# Patient Record
Sex: MALE | Race: WHITE | URBAN - METROPOLITAN AREA
[De-identification: names, ages, dates, MRNs, and addresses within clinical notes are randomized per-mention and may not be internally consistent; named-entity substitution may affect disease eponyms.]

---

## 2017-07-06 ENCOUNTER — HISTORICAL (OUTPATIENT)
Dept: LAB | Facility: HOSPITAL | Age: 42
End: 2017-07-06

## 2017-07-06 LAB
HAV IGM SERPL QL IA: NEGATIVE
HBV CORE IGM SERPL QL IA: NEGATIVE
HBV SURFACE AG SERPL QL IA: NEGATIVE
HCV AB SERPL QL IA: NEGATIVE
HEPATITIS PANEL INTERP: NORMAL
HIV 1+2 AB+HIV1 P24 AG SERPL QL IA: NEGATIVE
RPR SER QL: NORMAL

## 2018-02-14 ENCOUNTER — HISTORICAL (OUTPATIENT)
Dept: LAB | Facility: HOSPITAL | Age: 43
End: 2018-02-14

## 2019-05-17 ENCOUNTER — HISTORICAL (OUTPATIENT)
Dept: ADMINISTRATIVE | Facility: HOSPITAL | Age: 44
End: 2019-05-17

## 2019-05-17 LAB
ABS NEUT (OLG): 3 X10(3)/MCL (ref 2.1–9.2)
ALBUMIN SERPL-MCNC: 4.6 GM/DL (ref 3.4–5)
ALBUMIN/GLOB SERPL: 2.09 {RATIO} (ref 1.5–2.5)
ALP SERPL-CCNC: 62 UNIT/L (ref 38–126)
ALT SERPL-CCNC: 34 UNIT/L (ref 7–52)
AST SERPL-CCNC: 23 UNIT/L (ref 15–37)
BILIRUB SERPL-MCNC: 0.5 MG/DL (ref 0.2–1)
BILIRUBIN DIRECT+TOT PNL SERPL-MCNC: 0.1 MG/DL (ref 0–0.5)
BILIRUBIN DIRECT+TOT PNL SERPL-MCNC: 0.4 MG/DL
BUN SERPL-MCNC: 13 MG/DL (ref 7–18)
CALCIUM SERPL-MCNC: 9.1 MG/DL (ref 8.5–10)
CHLORIDE SERPL-SCNC: 104 MMOL/L (ref 98–107)
CHOLEST SERPL-MCNC: 263 MG/DL (ref 0–200)
CHOLEST/HDLC SERPL: 6.9 {RATIO}
CO2 SERPL-SCNC: 29 MMOL/L (ref 21–32)
CREAT SERPL-MCNC: 0.85 MG/DL (ref 0.6–1.3)
ERYTHROCYTE [DISTWIDTH] IN BLOOD BY AUTOMATED COUNT: 11.7 % (ref 11.5–17)
GLOBULIN SER-MCNC: 2.2 GM/DL (ref 1.2–3)
GLUCOSE SERPL-MCNC: 108 MG/DL (ref 74–106)
HCT VFR BLD AUTO: 42.8 % (ref 42–52)
HDLC SERPL-MCNC: 38 MG/DL (ref 35–60)
HGB BLD-MCNC: 15.1 GM/DL (ref 14–18)
LDLC SERPL CALC-MCNC: 145 MG/DL (ref 0–129)
LYMPHOCYTES # BLD AUTO: 1.9 X10(3)/MCL (ref 0.6–3.4)
LYMPHOCYTES NFR BLD AUTO: 35.5 % (ref 13–40)
MCH RBC QN AUTO: 31.8 PG (ref 27–31.2)
MCHC RBC AUTO-ENTMCNC: 35 GM/DL (ref 32–36)
MCV RBC AUTO: 90 FL (ref 80–94)
MONOCYTES # BLD AUTO: 0.5 X10(3)/MCL (ref 0.1–1.3)
MONOCYTES NFR BLD AUTO: 9.1 % (ref 0.1–24)
NEUTROPHILS NFR BLD AUTO: 55.4 % (ref 47–80)
PLATELET # BLD AUTO: 132 X10(3)/MCL (ref 130–400)
PMV BLD AUTO: 10.7 FL (ref 9.4–12.4)
POTASSIUM SERPL-SCNC: 4.1 MMOL/L (ref 3.5–5.1)
PROT SERPL-MCNC: 6.8 GM/DL (ref 6.4–8.2)
RBC # BLD AUTO: 4.75 X10(6)/MCL (ref 4.7–6.1)
SODIUM SERPL-SCNC: 137 MMOL/L (ref 136–145)
TRIGL SERPL-MCNC: 503 MG/DL (ref 30–150)
TSH SERPL-ACNC: 0.59 MIU/ML (ref 0.35–4.94)
VLDLC SERPL CALC-MCNC: 100.6 MG/DL
WBC # SPEC AUTO: 5.4 X10(3)/MCL (ref 4.5–11.5)

## 2019-08-21 ENCOUNTER — HISTORICAL (OUTPATIENT)
Dept: ADMINISTRATIVE | Facility: HOSPITAL | Age: 44
End: 2019-08-21

## 2019-08-21 LAB
CHOLEST SERPL-MCNC: 216 MG/DL (ref 0–200)
CHOLEST/HDLC SERPL: 5.1 {RATIO}
HDLC SERPL-MCNC: 42 MG/DL (ref 35–60)
LDLC SERPL CALC-MCNC: 142 MG/DL (ref 0–129)
TRIGL SERPL-MCNC: 173 MG/DL (ref 30–150)
VLDLC SERPL CALC-MCNC: 34.6 MG/DL

## 2020-07-15 ENCOUNTER — HISTORICAL (OUTPATIENT)
Dept: ENDOSCOPY | Facility: HOSPITAL | Age: 45
End: 2020-07-15

## 2020-12-01 ENCOUNTER — HISTORICAL (OUTPATIENT)
Dept: ADMINISTRATIVE | Facility: HOSPITAL | Age: 45
End: 2020-12-01

## 2020-12-01 LAB
APPEARANCE, UA: CLEAR
BACTERIA #/AREA URNS AUTO: NORMAL /HPF
BILIRUB UR QL STRIP: NEGATIVE MG/DL
COLOR UR: YELLOW
GLUCOSE (UA): NEGATIVE MG/DL
HGB UR QL STRIP: NEGATIVE UNIT/L
KETONES UR QL STRIP: NEGATIVE MG/DL
LEUKOCYTE ESTERASE UR QL STRIP: NEGATIVE UNIT/L
NITRITE UR QL STRIP.AUTO: NEGATIVE
PH UR STRIP: 7.5 [PH]
PROT UR QL STRIP: NEGATIVE MG/DL
RBC #/AREA URNS HPF: NORMAL /HPF
SP GR UR STRIP: 1.02
SQUAMOUS EPITHELIAL, UA: NORMAL /LPF
UROBILINOGEN UR STRIP-ACNC: 0.2 MG/DL
WBC #/AREA URNS AUTO: NORMAL /[HPF]

## 2020-12-03 LAB — FINAL CULTURE: NO GROWTH

## 2021-03-02 ENCOUNTER — HISTORICAL (OUTPATIENT)
Dept: ADMINISTRATIVE | Facility: HOSPITAL | Age: 46
End: 2021-03-02

## 2021-03-02 LAB
ABS NEUT (OLG): 2.9 X10(3)/MCL (ref 2.1–9.2)
ALBUMIN SERPL-MCNC: 4.4 GM/DL (ref 3.4–5)
ALBUMIN/GLOB SERPL: 2 {RATIO} (ref 1.5–2.5)
ALP SERPL-CCNC: 52 UNIT/L (ref 38–126)
ALT SERPL-CCNC: 68 UNIT/L (ref 7–52)
AST SERPL-CCNC: 35 UNIT/L (ref 15–37)
BILIRUB SERPL-MCNC: 0.9 MG/DL (ref 0.2–1)
BILIRUBIN DIRECT+TOT PNL SERPL-MCNC: 0.2 MG/DL (ref 0–0.5)
BILIRUBIN DIRECT+TOT PNL SERPL-MCNC: 0.7 MG/DL
BUN SERPL-MCNC: 15 MG/DL (ref 7–18)
CALCIUM SERPL-MCNC: 9.3 MG/DL (ref 8.5–10.1)
CHLORIDE SERPL-SCNC: 103 MMOL/L (ref 98–107)
CHOLEST SERPL-MCNC: 264 MG/DL (ref 0–200)
CHOLEST/HDLC SERPL: 6 {RATIO}
CO2 SERPL-SCNC: 31 MMOL/L (ref 21–32)
CREAT SERPL-MCNC: 1.01 MG/DL (ref 0.6–1.3)
ERYTHROCYTE [DISTWIDTH] IN BLOOD BY AUTOMATED COUNT: 11.8 % (ref 11.5–17)
GLOBULIN SER-MCNC: 1.8 GM/DL (ref 1.2–3)
GLUCOSE SERPL-MCNC: 105 MG/DL (ref 74–106)
HCT VFR BLD AUTO: 41.4 % (ref 42–52)
HDLC SERPL-MCNC: 44 MG/DL (ref 35–60)
HGB BLD-MCNC: 14.3 GM/DL (ref 14–18)
LDLC SERPL CALC-MCNC: 190 MG/DL (ref 0–129)
LYMPHOCYTES # BLD AUTO: 1.7 X10(3)/MCL (ref 0.6–3.4)
LYMPHOCYTES NFR BLD AUTO: 34 % (ref 13–40)
MCH RBC QN AUTO: 31.2 PG (ref 27–31.2)
MCHC RBC AUTO-ENTMCNC: 34 GM/DL (ref 32–36)
MCV RBC AUTO: 90 FL (ref 80–94)
MONOCYTES # BLD AUTO: 0.4 X10(3)/MCL (ref 0.1–1.3)
MONOCYTES NFR BLD AUTO: 7.9 % (ref 0.1–24)
NEUTROPHILS NFR BLD AUTO: 58.1 % (ref 47–80)
PLATELET # BLD AUTO: 143 X10(3)/MCL (ref 130–400)
PMV BLD AUTO: 11.4 FL (ref 9.4–12.4)
POTASSIUM SERPL-SCNC: 4.5 MMOL/L (ref 3.5–5.1)
PROT SERPL-MCNC: 6.6 GM/DL (ref 6.4–8.2)
RBC # BLD AUTO: 4.58 X10(6)/MCL (ref 4.7–6.1)
SODIUM SERPL-SCNC: 138 MMOL/L (ref 136–145)
TRIGL SERPL-MCNC: 180 MG/DL (ref 30–150)
TSH SERPL-ACNC: 0.64 MIU/ML (ref 0.35–4.94)
VLDLC SERPL CALC-MCNC: 36 MG/DL
WBC # SPEC AUTO: 5 X10(3)/MCL (ref 4.5–11.5)

## 2021-06-08 ENCOUNTER — HISTORICAL (OUTPATIENT)
Dept: ADMINISTRATIVE | Facility: HOSPITAL | Age: 46
End: 2021-06-08

## 2021-06-08 LAB
CHOLEST SERPL-MCNC: 247 MG/DL (ref 0–200)
CHOLEST/HDLC SERPL: 5.3 {RATIO}
HDLC SERPL-MCNC: 47 MG/DL (ref 35–60)
LDLC SERPL CALC-MCNC: 157 MG/DL (ref 0–129)
TRIGL SERPL-MCNC: 270 MG/DL (ref 30–150)
VLDLC SERPL CALC-MCNC: 54 MG/DL

## 2022-04-11 ENCOUNTER — HISTORICAL (OUTPATIENT)
Dept: ADMINISTRATIVE | Facility: HOSPITAL | Age: 47
End: 2022-04-11

## 2022-04-29 VITALS
DIASTOLIC BLOOD PRESSURE: 88 MMHG | WEIGHT: 178.13 LBS | BODY MASS INDEX: 26.38 KG/M2 | OXYGEN SATURATION: 98 % | SYSTOLIC BLOOD PRESSURE: 120 MMHG | HEIGHT: 69 IN

## 2022-04-30 NOTE — OP NOTE
DATE OF SURGERY:    07/15/2020    SURGEON:  Roberto Thomas MD    PROCEDURE:  Colonoscopy, terminal ileoscopy, hemoclip placement and snare polypectomy.    PREOPERATIVE DIAGNOSIS:  Ominous family history for colon cancer, mother in her 50s, maternal grandfather.  Screening colonoscopy planned.    POSTOPERATIVE DIAGNOSES:    1. Adequate sedation with Diprivan per Anesthesia.  2. Good colon prep with successful exam to the cecum and into a normal terminal ileum.  3. Two polyps noted at around 15 cm in the rectum; one was diminutive, a 4 or 5 mm polyp addressed with a cold snare; the other polyp was a pedunculated entity that measured well over a cm.  We placed a hemoclip at the base of the stalk and were able to ensnare the polyp, though we were relegated to a piecemeal approach taking the bulk in 1 fell swoop and then residual in portions.  At the end of the procedure, there was no visible residual.  The initial clip dislodged and a second hemoclip was placed at the polyp site.    PROCEDURE IN DETAIL:  The patient consented for the procedure after a detailed explanation of the risks and complications, including bleeding, perforation and adverse reaction to sedation.  The patient was placed in the left lateral decubitus position.  Initially we examined the perineum and performed a digital exam.     The video colonoscope was inserted in the rectal vault and passed under direct vision.  We retroflexed to allow visualization of the anal verge and then de-retroflexed, traversing the sigmoid colon, descending colon and splenic flexure, transverse colon, hepatic flexure, ascending colon and entering the cecum.  The cecum was identified by visualizing the appendiceal orifice and the ileocecal valve.  The scope was withdrawn with reexamination of the mucosa and with biopsies, polypectomies and specimens removed as outlined below.    FINDINGS:  The patient's findings are as detailed.  We saw through to a normal  terminal ileum.  A sizable polyp with another tiny polyp were noted in the proximal rectum and addressed with the snare; the larger polyp with the snare and cautery in a piecemeal fashion without visible residual and with a hemoclip at its base postprocedure.    DISCUSSION AND DISCHARGE SUMMARY:  After his procedure was completed, we discussed our findings with the patient and his attendant.  We examined him and found him stable for discharge.  We allowed that he could resume his usual diet today and activities tomorrow.  He is to call if he has not heard his biopsies within the week.  I am going to ask him to avoid aspirin and nonsteroidals and limit his exertion for the next several days.  Certainly, his siblings need screening beginning at age 35.  I would be inclined to suggest that he ought to be rescoped in 3 years and then, if things are reassuring, at least every 5 years thereafter.        ______________________________  MD BERLIN Galaviz/OBDULIA  DD:  07/15/2020  Time:  08:34AM  DT:  07/15/2020  Time:  08:48AM  Job #:  [id number]    cc: MD Roberto Flores Jr., MD

## 2022-04-30 NOTE — OP NOTE
DATE OF SURGERY:    07/15/2020    SURGEON:  Roberto Thomas MD    Mr. Marquez is a pleasant 45-year-old known to me from previous evaluation.  In fact, I may have attended to his mother who had colon cancer at age 50 and succumbed in later years to metastatic disease.  Mr. Marquez was seen with dyspepsia back in 2007 as a 32-year-old and underwent upper endoscopic evaluation.  We found a PyloriTek-negative antral erythema and a patulous hiatus.  At that juncture, given his family history, we proposed that he ought to consider screening colonoscopy in 8 years at age 40.  We did alert him at that juncture.  He now presents at 45 for screening.  He has had some rare hematochezia, perhaps some months ago and then more recently, but for the most part, denies lower gastrointestinal symptomatology.    ALLERGIES:  None.    MEDICATIONS:  Essentially none.    PAST MEDICAL HISTORY:  Remarkable for mild reflux, which is currently, for the most part, diet controlled.    SOCIAL HISTORY:   with step children.  He is a .  Nonsmoker.  Rare drinker.    FAMILY HISTORY:  His mother had colon cancer at age 50, and I believe his maternal grandfather had colon cancer in his 70s.    PHYSICAL EXAMINATION:  VITAL SIGNS:  Found him afebrile with a blood pressure 120/88, pulse 86, respirations 24.     HEART:  Regular.   LUNGS:  Clear.   ABDOMEN:  Soft and nontender.  He does have a number of tattoos.    DISCUSSION:  45-year-old patient with a strong family history of colonic neoplasia at a young age, scheduled for screening colonoscopy.  Very rare hematochezia.      PLAN:  Colonoscopy with further recommendations to follow.        ______________________________  MD BERLIN Galaviz/MAEVE  DD:  07/15/2020  Time:  07:49AM  DT:  07/15/2020  Time:  08:15AM  Job #:  [id number]    cc: MD Roberto Flores Jr., MD